# Patient Record
Sex: MALE | Race: WHITE | NOT HISPANIC OR LATINO | Employment: OTHER | URBAN - METROPOLITAN AREA
[De-identification: names, ages, dates, MRNs, and addresses within clinical notes are randomized per-mention and may not be internally consistent; named-entity substitution may affect disease eponyms.]

---
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
## 2024-12-25 ENCOUNTER — HOSPITAL ENCOUNTER (EMERGENCY)
Facility: CLINIC | Age: 19
Discharge: HOME | End: 2024-12-25
Attending: EMERGENCY MEDICINE
Payer: COMMERCIAL

## 2024-12-25 ENCOUNTER — TELEPHONE (OUTPATIENT)
Dept: BEHAVIORAL HEALTH | Facility: CLINIC | Age: 19
End: 2024-12-25

## 2024-12-25 VITALS
DIASTOLIC BLOOD PRESSURE: 91 MMHG | HEART RATE: 86 BPM | WEIGHT: 165 LBS | HEIGHT: 71 IN | TEMPERATURE: 98.3 F | RESPIRATION RATE: 20 BRPM | OXYGEN SATURATION: 100 % | SYSTOLIC BLOOD PRESSURE: 150 MMHG | BODY MASS INDEX: 23.1 KG/M2

## 2024-12-25 DIAGNOSIS — F31.9 BIPOLAR 1 DISORDER (H): ICD-10-CM

## 2024-12-25 PROCEDURE — 99284 EMERGENCY DEPT VISIT MOD MDM: CPT

## 2024-12-25 PROCEDURE — 80307 DRUG TEST PRSMV CHEM ANLYZR: CPT | Performed by: EMERGENCY MEDICINE

## 2024-12-25 PROCEDURE — 250N000013 HC RX MED GY IP 250 OP 250 PS 637: Performed by: EMERGENCY MEDICINE

## 2024-12-25 RX ORDER — HYDROXYZINE HYDROCHLORIDE 25 MG/1
25 TABLET, FILM COATED ORAL ONCE
Status: COMPLETED | OUTPATIENT
Start: 2024-12-25 | End: 2024-12-25

## 2024-12-25 RX ADMIN — HYDROXYZINE HYDROCHLORIDE 25 MG: 25 TABLET ORAL at 02:30

## 2024-12-25 ASSESSMENT — COLUMBIA-SUICIDE SEVERITY RATING SCALE - C-SSRS
SUICIDE, SINCE LAST CONTACT: NO
1. IN THE PAST MONTH, HAVE YOU WISHED YOU WERE DEAD OR WISHED YOU COULD GO TO SLEEP AND NOT WAKE UP?: NO
6. HAVE YOU EVER DONE ANYTHING, STARTED TO DO ANYTHING, OR PREPARED TO DO ANYTHING TO END YOUR LIFE?: NO
TOTAL  NUMBER OF INTERRUPTED ATTEMPTS SINCE LAST CONTACT: NO
2. HAVE YOU ACTUALLY HAD ANY THOUGHTS OF KILLING YOURSELF?: NO
ATTEMPT SINCE LAST CONTACT: NO
2. HAVE YOU ACTUALLY HAD ANY THOUGHTS OF KILLING YOURSELF IN THE PAST MONTH?: NO
6. HAVE YOU EVER DONE ANYTHING, STARTED TO DO ANYTHING, OR PREPARED TO DO ANYTHING TO END YOUR LIFE?: NO
1. SINCE LAST CONTACT, HAVE YOU WISHED YOU WERE DEAD OR WISHED YOU COULD GO TO SLEEP AND NOT WAKE UP?: NO
TOTAL  NUMBER OF ABORTED OR SELF INTERRUPTED ATTEMPTS SINCE LAST CONTACT: NO

## 2024-12-25 ASSESSMENT — ACTIVITIES OF DAILY LIVING (ADL)
ADLS_ACUITY_SCORE: 46

## 2024-12-25 NOTE — DISCHARGE INSTRUCTIONS
"Please follow-up with your established psychiatrist and therapist after this ED visit.  If you have worsening symptoms we can reevaluate you at any time.    A referral to the [facility] has been initiated for brief therapy. TC staff member will contact you within the next 24 hours to set up an appointment. [facility] is \"urgent care\" for mental health. Appointments could be virtual or in-person. TC can meet and provide services until you are able to start with your next level of care. Contact [facility]  #[phone] if you have questions.       [facility]  [address], [place] [address]  [phone]    "

## 2024-12-25 NOTE — ED PROVIDER NOTES
EMERGENCY DEPARTMENT ENCOUNTER      NAME: Davion Tomas  AGE: 19 year old male  YOB: 2005  MRN: [id number]  EVALUATION DATE & TIME: No admission date for patient encounter.    PCP: No Ref-Primary, Physician    ED PROVIDER: Troy Freeman M.D.      Chief Complaint   Patient presents with    Manic Behavior         FINAL IMPRESSION:  1. Bipolar 1 disorder (H)          ED COURSE & MEDICAL DECISION MAKING:    Pertinent Labs & Imaging studies reviewed. (See chart for details)  19 year old male presents to the Emergency Department for evaluation of mental health concern.  Patient states he is feeling more manic.  He is pacing in the room.  Denies suicidal ideation.  States he wants admission.  U tox is done and is negative.  Patient seen by the DEC .  At this time does not seem like he needs inpatient admission.  Patient would like some medication management.  Plan at this time is to have psych eval this morning to discuss med management and hopefully keep him out of the hospital.  Consult for this is put in.  Patient be signed out to the oncoming physician pending reevaluation.    12:47 AM I met with the patient to gather history and to perform my initial exam. I discussed the plan for care while in the Emergency Department.   2:08 AM I spoke with Monty Ivory with DEC      At the conclusion of the encounter I discussed the results of all of the tests and the disposition. The questions were answered. The patient or family acknowledged understanding and was agreeable with the care plan.     Medical Decision Making  Obtained supplemental history:Supplemental history obtained?: No  Reviewed external records: External records reviewed?: Documented in chart and Inpatient Record: inpatient admission 11/20/24  Care impacted by chronic illness:Alcohol and/or Drug Abuse or Dependence and Mental Health  Did you consider but not order tests?: Work up considered but not performed and documented in chart, if  "applicable  Did you interpret images independently?: Independent interpretation of ECG and images noted in documentation, when applicable.  Consultation discussion with other provider:Did you involve another provider (consultant, , pharmacy, etc.)?: I discussed the care with another health care provider, see documentation for details.  Admission considered. Patient was signed out to the oncoming physician, disposition pending.    MIPS: Not Applicable           MEDICATIONS GIVEN IN THE EMERGENCY:  Medications   hydrOXYzine HCl (ATARAX) tablet 25 mg (25 mg Oral $Given 12/25/24 8604)       NEW PRESCRIPTIONS STARTED AT TODAY'S ER VISIT  New Prescriptions    No medications on file          =================================================================    HPI    Patient information was obtained from: Patient        Davion Tomas is a 19 year old male with a pertinent history of ADHD, PTSD, autism, benzodiazepine abuse, bipolar who presents to this ED for evaluation of gianni.  Patient states he has been feeling more manic.  States this started yesterday after he got multiple text from his mom.  States he feels \"high\" though denies using any drugs.  States he is feeling no pain.  Having rushing thoughts.  States he is having flight of ideas.  Denies any suicidal ideation.  Denies any hallucinations currently.  States he is very hungry and wants to eat that seems to be his main concern right now.  He states has been compliant with his medications and last take his valproic acid at 12:15 AM.    Patient was recently admitted for mental health from November 20 to the 26.  This was for gianni and bipolar.  At that point he had similar symptoms.          PAST MEDICAL HISTORY:  Past Medical History:   Diagnosis Date    Environmental allergies        PAST SURGICAL HISTORY:  No past surgical history on file.        CURRENT MEDICATIONS:    No current facility-administered medications for this encounter.     Current Outpatient " "Medications   Medication Sig Dispense Refill    fluticasone (FLONASE) 50 MCG/ACT nasal spray Spray 1 spray into both nostrils daily 9.9 mL 0    propranolol (INDERAL) 20 MG tablet Take 1 tablet (20 mg) by mouth 2 times daily 60 tablet 0         ALLERGIES:  Allergies   Allergen Reactions    Cat Hair Extract Other (See Comments)     Wheezing, coughing, itching    Haloperidol Other (See Comments)    Other  [Seasonal Allergies] Other (See Comments)     Environmental Allergies and Sesame     Shellfish-Derived Products Hives and Swelling       FAMILY HISTORY:  No family history on file.    SOCIAL HISTORY:   Social History     Socioeconomic History    Marital status: Single   Tobacco Use    Smoking status: Never    Smokeless tobacco: Never   Substance and Sexual Activity    Alcohol use: Yes     Comment: occasional drinking    Drug use: Yes     Types: Marijuana     Comment: \"fake xanax\"; delta 8 gummies    Sexual activity: Not Currently     Social Drivers of Health     Financial Resource Strain: Medium Risk (11/20/2024)    [facility]    Financial Resource Strain     Difficulty of Paying Living Expenses: 2     Difficulty of Paying Living Expenses: 1   Food Insecurity: No Food Insecurity (11/20/2024)    Received from [facility]    Food Insecurity     Do you worry your food will run out before you are able to buy more?: 1   Transportation Needs: Unmet Transportation Needs (11/20/2024)    Received from [facility]    Transportation Needs     Does lack of transportation keep you from medical appointments?: 2     Does lack of transportation keep you from work, meetings or getting things that you need?: 2   Physical Activity: Inactive (11/23/2021)    Exercise Vital Sign     Days of Exercise per Week: 0 days     Minutes of Exercise per Session: 0 min   Social Connections: Socially Isolated (11/20/2024)    Received from " "[facility]    Social Connections     Do you often feel lonely or isolated from those around you?: 4   Housing Stability: Low Risk  (11/20/2024)    Received from [facility]    Housing Stability     What is your housing situation today?: 1       VITALS:  BP (!) 150/91   Pulse 86   Temp 98.3  F (36.8  C) (Temporal)   Resp 20   Ht 1.803 m (5' 11\")   Wt 74.8 kg (165 lb)   SpO2 100%   BMI 23.01 kg/m      PHYSICAL EXAM    Physical Exam  Vitals and nursing note reviewed.   Constitutional:       General: He is not in acute distress.     Appearance: He is not diaphoretic.   HENT:      Head: Atraumatic.   Eyes:      General: No scleral icterus.     Pupils: Pupils are equal, round, and reactive to light.   Cardiovascular:      Rate and Rhythm: Normal rate and regular rhythm.      Heart sounds: Normal heart sounds.   Pulmonary:      Effort: No respiratory distress.      Breath sounds: Normal breath sounds.   Abdominal:      Palpations: Abdomen is soft.      Tenderness: There is no abdominal tenderness.   Musculoskeletal:         General: No tenderness.   Lymphadenopathy:      Cervical: No cervical adenopathy.   Skin:     General: Skin is warm.      Findings: No rash.           LAB:  All pertinent labs reviewed and interpreted.  Labs Ordered and Resulted from Time of ED Arrival to Time of ED Departure   URINE DRUG SCREEN PANEL - Normal       Result Value    Amphetamines Urine Screen Negative      Barbituates Urine Screen Negative      Benzodiazepine Urine Screen Negative      Cannabinoids Urine Screen Negative      Cocaine Urine Screen Negative      Fentanyl Qual Urine Screen Negative      Opiates Urine Screen Negative      PCP Urine Screen Negative         RADIOLOGY:  Reviewed all pertinent imaging. Please see official radiology report.  No orders to display         Bhargavi HORTA, am serving as a scribe to document services personally performed by  " Troy Freeman, based on my observation and the provider's statements to me. I, Troy Freeman MD attest that Bhargavi Alvarado is acting in a scribe capacity, has observed my performance of the services and has documented them in accordance with my direction.    Troy Freeman M.D.  Emergency Medicine  [facility] EMERGENCY ROOM  [address] [address]  [phone]  Dept: [phone]       Troy Freeman MD  12/25/24 0657

## 2024-12-25 NOTE — ED TRIAGE NOTES
Patient presents to the ED in a maniac state.  He is pacing in triage.  He is cooperative with staff and pleasant.  Patient is requesting no hospital scrubs.  Patient described distress due to physical sensory issues due to autism.  He was see by writer with security and the wand was used to check for any metal.  He is in appropriate attire with no strings on clothing and no jewelry.  Patient denies thoughts of self harm.       Triage Assessment (Adult)       Row Name 12/25/24 0016          Triage Assessment    Airway WDL WDL        Respiratory WDL    Respiratory WDL WDL        Skin Circulation/Temperature WDL    Skin Circulation/Temperature WDL WDL        Cardiac WDL    Cardiac WDL WDL        Peripheral/Neurovascular WDL    Peripheral Neurovascular WDL WDL        Cognitive/Neuro/Behavioral WDL    Cognitive/Neuro/Behavioral WDL X;mood/behavior     Mood/Behavior anxious;excitable;hyperactive (agitated, impulsive);restless;cooperative        Roscoe Coma Scale    Best Eye Response 4-->(E4) spontaneous     Best Motor Response 6-->(M6) obeys commands     Best Verbal Response 5-->(V5) oriented     [place] Coma Scale Score 15     Assessment Qualifiers patient not sedated/intubated;no eye obstruction present

## 2024-12-25 NOTE — TELEPHONE ENCOUNTER
Libbyr spoke with pt and scheduled TC psychiatry appointment on 01/13/2024 @ 2:30 pm.    Winsome Garcia  12/25/2024  1116

## 2024-12-25 NOTE — PROGRESS NOTES
"Triage and Transition Services Extended Care Reassessment     Patient: Davion goes by \"Davion,\" uses he/him pronouns  Date of Service: December 25, 2024  Site of Service: [facility] EMERGENCY ROOM                               Patient was seen yes  Mode of Assessment: Virtual: [employer]     Reason for Reassessment: worsening psychosocial stress, other (see comment) (Pt expresses having concerns for gianni and insomnia.)    History of Patient's Original Emergency Room Encounter: Pt stated he wants his valproic acid dose doubled for an increased feeling of gianni and appeared to insist on IPMH placement. Pt denied SI, HI, NSSIB, hallucinations, and substance use. Pt reported familiarity with the DSM and presented his concerns by listing sx common to a manic episode. For example, pt cited grandiosity, however, he appeared to demonstrate insight which contradicted such delusions; pt reported experiencing a flight of ideas and, while presenting as anxious in affect/speech, his thoughts/speech appeared organized and he appeared very capable of following a conversation without diversion/distraction even when interrupted by this writer. Pt reported a decrease in appetite but still \"forcing\" himself to eat c. 2 meals a day (per chart notes, pt was reported to state upon/after arrival that he was very hungry and wanted to eat). Aside from pt endorsing a decreased need for sleep and expressing worry that he might lose control, pt's reporting of concerns did not appear to substantiate Criterion B for a manic episode. Collateral could have provided insight into pt's baseline, however, pt stated it would not be the best idea to contact his sister at 2 am to receive that information. Per a review of the chart, pt has past dx for ADHD, ASD, PTSD, benzodiazepine use disorder, and bipolar. Pt endorsed one past suicide attempt to overdose on Tylenol at age 12 and NSSIB by cutting between the ages of [age], which " "ended because he \"just stopped.\" Pt endorsed having a therapist for 1 month and minimal social supports, stating he has one friend and that, out of all of his family, he is only on good terms with his sister. Pt reported that other family members are associated with past trauma related to his dx of PTSD. While pt reported dropping out of college a second time two months ago and having difficulty holding employment, he denied neglecting self-care while endorsing an ability to perform ADLs and maintain medication compliance. Pt reported adequate savings (i.e., \"$25,000\"), active insurance, and his own apartment. Pt's chart suggests past misuse of benzodiazepines; pt denied this and stated he currently has a Rx for clonazepam.    Current Patient Presentation: Pt exhibits some mental health distress. He does have fair insight and judgment. He reports he is here because he is concerrned his mental health symptoms will worsen if he is not seen by psychiatry. He is requesting IPMH treatment but later changes his mind. Pt denies having suicidal ideation, plans, or intent. He denies HI, NSSI, or AVH's. He reports he has recently been getting about 2 hours of sleep per day.    Presentation Summary: Pt is seen by extended care for therapeutic check-in and reassessment. Exchanged greeting, introduced self and role. Pt was irritable and challenged this interviewer about the roles, position, title, purpose of the meeting. He frequently told interviewer to find the information in his medical records or insinuated the information could be found in medical records. Writer informed patient of the purpose of this interview was to collaboate with patient, obtain history, provide supportive therapy and reassessment, to assist in determining appropriate level of services. Pt was observed to be able to participate in the assessment as evidenced by verbal consent. Pt is standing during the interview and expresses feeling restlessness and " irritability. He feels like he is losing control. He reports to have a therapist and currently no psychaitrist. He reports to have a significant family history for trauma and abuse. He says he is triggered by the holidays. He did have a psychiatrist but they wanted to put him on ketamine and some other medications but patient believes he has bipolar disorder which is not intented to use ketamine treatments. Pt initially wanted inpatient psychiatric treatment and then stated he would like to be seen by a psychiatrist and be started on a new medication regimen. He states he currently on valproic acid. He was taking Olanzapine but he titrated off of that because he said it made him to tired and sleepy. Pt is requesting to try Latuda. Writer met with patient on two brief occasions to clarify what the patient wanted to have happen today. The second time the patient indicated he did not want inpatient but did continue to request to be seen by psychiatry. Writer placed a psychiatric consult order. Pt did not wait around and he requested to leave the ER. Writer was notified that patient had discharged and the extended care team placed a referral to Transition [facility] to follow up with patient. Pt left and did not participate in safety planning process.    Changes Observed Since Initial Assessment: decrease in presenting symptoms    Therapeutic Interventions Provided: Engaged in guided discovery, explored patient's perspectives and helped expand them through socratic dialogue., Engaged in cognitive restructuring/ reframing, looked at common cognitive distortions and challenged negative thoughts.    Current Symptoms: anxious, excessive worry, racing thoughts, obsessions/compulsions irritable, helplessness, sense of doom anxious inattentive, agitation, grandiosity      Mental Status Exam   Affect: Appropriate  Appearance: Appropriate  Attention Span/Concentration: Attentive  Eye Contact: Engaged    Fund of Knowledge:  Appropriate   Language /Speech Content: Fluent  Language /Speech Volume: Normal  Language /Speech Rate/Productions: Normal  Recent Memory: Intact  Remote Memory: Intact  Mood: Anxious, Irritable  Orientation to Person: Yes   Orientation to Place: Yes  Orientation to Time of Day: Yes  Orientation to Date: Yes     Situation (Do they understand why they are here?): Yes  Psychomotor Behavior: Normal  Thought Content: Clear  Thought Form: Goal Directed    Treatment Objective(s) Addressed: rapport building, orienting the patient to therapy, processing feelings, assessing safety, identifying additional supports, building skills    Patient Response to Interventions: acceptance expressed, verbalizes understanding    Progress Towards Goals:  Patient Reports Symptoms Are: improving  Patient Progress Toward Goals: is making progress  Comment: Pt has been minimally receptive to ED interventions.Pt is considered to be clear and stable from mental health perspective. He does not require IPMH treatment.  Next Step to Work Toward Discharge: follow up on referrals  Symptom Stabilization Comment: Pt is referred to transition clinic for psychiatric provider follow up.    Case Management: Case Management Included: collaborating with patient's support system  Details on Collaborating with Patient's Support System: Renetta Joaquin, [phone]    C-SSRS Since Last Contact:   1. Wish to be Dead (Since Last Contact): No  2. Non-Specific Active Suicidal Thoughts (Since Last Contact): No     Actual Attempt (Since Last Contact): No  Has subject engaged in non-suicidal self-injurious behavior? (Since Last Contact): No  Interrupted Attempts (Since Last Contact): No  Aborted or Self-Interrupted Attempt (Since Last Contact): No  Preparatory Acts or Behavior (Since Last Contact): No  Suicide (Since Last Contact): No     Calculated C-SSRS Risk Score (Since Last Contact): No Risk Indicated    Plan: Final Disposition / Recommended Care Path:  discharge  Plan for Care reviewed with assigned Medical Provider: yes  Plan for Care Team Review: provider    Comments: consulted with ER provider regarding disposition and planning  Patient and/or validated legal guardian concurs: yes    Clinical Substantiation: Pt exhibits some mental health distress. He does have fair insight and judgment. He reports he is here because he is concerrned his mental health symptoms will worsen if he is not seen by psychiatry. He is requesting IPMH treatment. Pt denies having suicidal ideation, plans, or intent. He denies HI, NSSI, or AVH's. He reports he has recently been getting about 2 hours of sleep per day.    Legal Status: Legal Status: Voluntary/Patient has signed consent for treatment    Session Status: Time session started: 0932  Time session ended: 1000  Session Duration (minutes): 28 minutes  Session Number: 1  Anticipated number of sessions or this episode of care: 1    Session Start Time: 0932  Session Stop Time: 1000  CPT codes: 03941 - Psychotherapy (with patient) - 30 (16-37*) min  Time Spent: 28 minutes      CPT code(s) utilized: 26083 - Psychotherapy (with patient) - 30 (16-37*) min    Diagnosis:   Patient Active Problem List   Diagnosis Code    Tylenol overdose, intentional self-harm, initial encounter (H) T39.1X2A    Attention-deficit hyperactivity disorder, unspecified type F90.9    Moderate recurrent major depression (H) F33.1    PHILLIP (generalized anxiety disorder) F41.1    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

## 2024-12-25 NOTE — PLAN OF CARE
Davion Tomas  December 25, 2024  Plan of Care Hand-off Note     Patient Recommended Care Path: observation    Clinical Substantiation:  It is the recommendation of this provider that pt remain under observation with psychiatry and extended care. Pt presented to the ER out of concern for gianni and the possibility of losing control, along with being triggered by multiple texts/calls from family after he cancelled travel plans to meet with them over the holiday. Pt reported familiarity with the DSM and cited common sx of a manic episode, however, pt's presentation and narrative of events did not appear to substantiate sufficient criteria during the assessment. Pt requested an increase in valproic acid and appeared to insist upon voluntary IPMH placement. Pt endorsed remaining compliant on medications, an ability to perform ADLs, having safe/stable housing, and sufficent funds in savings for immediate present ($25,000) although currently unemployed.  Pt denied SI, HI, NSSIB, hallucinations, and substance use. Although collateral could not be obtained for insight into pt's baseline and behaviors according to the observation of others, not enough evidence appeared present to require IPMH placement. Pt denied psychosis, did not report or show signs of complete neglect for self-care or deterioration, denied thoughts/plans of harming himself or others, and did not provide evidence for and has not been reported as demonstrating behaviors that are aggressive, intrusive, or disruptive. As pt has a dx of bipolar, his concerns for the possibility of re-experiencing a manic episode or that his medications may not be entirely sufficient appear valid, along his concerns about his ability to maintain important roles/responsibilities following task failure in school and work settings. Therefore, the lesser restrictive level of care of observation is currently recommended to provide hospital staff time to gather collateral and  provide psychiatric consultation to determine if pt is in need of IPMH placement or would more likely benefit from other services/resources.    Goals for crisis stabilization:  Reduce distress, enhance locus of control, develop additional coping skills    Next steps for Care Team:  Monitoring under observation to determine acuity of sx, obtain collateral, psychiatric consultation, serial reassessments, support with resources/referrals.    Treatment Objectives Addressed:  rapport building, processing feelings, assessing safety, identifying additional supports    Therapeutic Interventions:  Engaged in guided discovery, explored patient's perspectives and helped expand them through socratic dialogue.    Has a specific means been identified for suicidal.homicide actions: No    Patient coping skills attempted to reduce the crisis:  Help-seeking, medication compliance    Collateral contact information: n/a    Legal Status: Voluntary/Patient has signed consent for treatment                         Reviewed court records: yes     Psychiatry Consult: Patient has Psychiatry Consult Order    Monty Quinn Psychotherapist Trainee

## 2024-12-25 NOTE — CONSULTS
"Diagnostic Evaluation Consultation  Crisis Assessment    Patient Name: Davion Tomas  Age:  19 year old  Legal Sex: male  Gender Identity: male  Pronouns:   Race: White  Ethnicity: Not  or   Language: English      Patient was assessed: In person   Crisis Assessment Start Date: 12/25/24  Crisis Assessment Start Time: 0131  Crisis Assessment Stop Time: 0157  Patient location: [facility] EMERGENCY ROOM                             [id number]    Referral Data and Chief Complaint  Davion Tomas presents to the ED by  self. Patient is presenting to the ED for the following concerns: Worsening psychosocial stress. Factors that make the mental health crisis life threatening or complex are: Pt arrived at the ER, having driven himself, presenting with worsening psychosocial stress. Pt stated he is triggered by his \"family blowing up (his) phone\" after he cancelled his plane ticked to visit them in [place] for the holiday.      Informed Consent and Assessment Methods  Explained the crisis assessment process, including applicable information disclosures and limits to confidentiality, assessed understanding of the process, and obtained consent to proceed with the assessment.  Assessment methods included conducting a formal interview with patient, review of medical records, collaboration with medical staff, and obtaining relevant collateral information from family and community providers when available.  : done     History of the Crisis   Pt stated he wants his valproic acid dose doubled for an increased feeling of gianni and appeared to insist on IPMH placement. Pt denied SI, HI, NSSIB, hallucinations, and substance use. Pt reported familiarity with the DSM and presented his concerns by listing sx common to a manic episode. For example, pt cited grandiosity, however, he appeared to demonstrate insight which contradicted such delusions; pt reported experiencing a flight of ideas and, " "while presenting as anxious in affect/speech, his thoughts/speech appeared organized and he appeared very capable of following a conversation without diversion/distraction even when interrupted by this writer. Pt reported a decrease in appetite but still \"forcing\" himself to eat c. 2 meals a day (per chart notes, pt was reported to state upon/after arrival that he was very hungry and wanted to eat). Aside from pt endorsing a decreased need for sleep and expressing worry that he might lose control, pt's reporting of concerns did not appear to substantiate Criterion B for a manic episode. Collateral could have provided insight into pt's baseline, however, pt stated it would not be the best idea to contact his sister at 2 am to receive that information. Per a review of the chart, pt has past dx for ADHD, ASD, PTSD, benzodiazepine use disorder, and bipolar. Pt endorsed one past suicide attempt to overdose on Tylenol at age 12 and NSSIB by cutting between the ages of [age], which ended because he \"just stopped.\" Pt endorsed having a therapist for 1 month and minimal social supports, stating he has one friend and that, out of all of his family, he is only on good terms with his sister. Pt reported that other family members are associated with past trauma related to his dx of PTSD. While pt reported dropping out of college a second time two months ago and having difficulty holding employment, he denied neglecting self-care while endorsing an ability to perform ADLs and maintain medication compliance. Pt reported adequate savings (i.e., \"$25,000\"), active insurance, and his own apartment. Pt's chart suggests past misuse of benzodiazepines; pt denied this and stated he currently has a Rx for clonazepam.    Brief Psychosocial History  Family:   , Children    Support System:  Sibling(s), Friend  Employment Status:  unemployed  Source of Income:  none  Financial Environmental Concerns:  none  Current Hobbies:  " arts/crafts  Barriers in Personal Life:       Significant Clinical History  Current Anxiety Symptoms:  anxious  Current Depression/Trauma:  avoidance, sense of doom  Current Somatic Symptoms:     Current Psychosis/Thought Disturbance:     Current Eating Symptoms:  loss of appetite  Chemical Use History:  Alcohol: None  Benzodiazepines: None  Opiates: None  Cocaine: None  Marijuana: None  Other Use: None   Past diagnosis:  ADHD, Bipolar Disorder, Suicide attempt(s), PTSD, Autism  Family history:  No known history of mental health or chemical health concerns  Past treatment:  Individual therapy, Primary Care, Psychiatric Medication Management, Inpatient Hospitalization  Details of most recent treatment:  OP psychiatry and therapy  Other relevant history:       Have there been any medication changes in the past two weeks:  no       Is the patient compliant with medications:  yes        Collateral Information  Is there collateral information: No (Pt declined for this writer to contact collateral at this time of night)      Risk Assessment  [place] Suicide Severity Rating Scale Full Clinical Version:  Suicidal Ideation  Q1 Wish to be Dead (Lifetime): Yes  Q2 Non-Specific Active Suicidal Thoughts (Lifetime): No  3. Active Suicidal Ideation with any Methods (Not Plan) Without Intent to Act (Lifetime): No  Q4 Active Suicidal Ideation with Some Intent to Act, Without Specific Plan (Lifetime): No  Q5 Active Suicidal Ideation with Specific Plan and Intent (Lifetime): Yes  Q6 Suicide Behavior (Lifetime): yes     Suicidal Behavior (Lifetime)  Actual Attempt (Lifetime): Yes  Total Number of Actual Attempts (Lifetime): 1  Actual Attempt Description (Lifetime): Attempted OD on Tylenol at age 12  Has subject engaged in non-suicidal self-injurious behavior? (Lifetime): Yes  Interrupted Attempts (Lifetime): No  Aborted or Self-Interrupted Attempt (Lifetime): No  Preparatory Acts or Behavior (Lifetime): Yes  Total Number of  Preparatory Acts (Lifetime): 1  Preparatory Acts or Behavior Description (Lifetime): Gathered [facility] Suicide Severity Rating Scale Recent:   Suicidal Ideation (Recent)  Q1 Wished to be Dead (Past Month): no  Q2 Suicidal Thoughts (Past Month): no  Level of Risk per Screen: no risks indicated     Suicidal Behavior (Recent)  Actual Attempt (Past 3 Months): No  Has subject engaged in non-suicidal self-injurious behavior? (Past 3 Months): No  Interrupted Attempts (Past 3 Months): No  Aborted or Self-Interrupted Attempt (Past 3 Months): No  Preparatory Acts or Behavior (Past 3 Months): No    Environmental or Psychosocial Events: work or task failure, challenging interpersonal relationships, social isolation, neither working nor attending school  Protective Factors: Protective Factors: lives in a responsibly safe and stable environment, sense of importance of health and wellness, able to access care without barriers, supportive ongoing medical and mental health care relationships, help seeking, good problem-solving, coping, and conflict resolution skills, good impulse control, constructive use of leisure time, enjoyable activities, resilience, reality testing ability, optimistic outlook - identification of future goals    Does the patient have thoughts of harming others? Feels Like Hurting Others: no  Previous Attempt to Hurt Others: no  Does Patient have a known history of aggressive behavior: No    Is the patient engaging in sexually inappropriate behavior?           Mental Status Exam   Affect: Appropriate  Appearance: Appropriate  Attention Span/Concentration: Attentive  Eye Contact: Engaged    Fund of Knowledge: Appropriate   Language /Speech Content: Fluent  Language /Speech Volume: Normal  Language /Speech Rate/Productions: Normal  Recent Memory: Intact  Remote Memory: Intact  Mood: Anxious  Orientation to Person: Yes   Orientation to Place: Yes  Orientation to Time of Day: Yes  Orientation to Date: Yes      Situation (Do they understand why they are here?): Yes  Psychomotor Behavior: Normal  Thought Content: Clear  Thought Form: Intact     Medication  Psychotropic medications:   Medication Orders - Psychiatric (From admission, onward)      None          No current facility-administered medications for this encounter.     Current Outpatient Medications   Medication Sig Dispense Refill    fluticasone (FLONASE) 50 MCG/ACT nasal spray Spray 1 spray into both nostrils daily 9.9 mL 0    propranolol (INDERAL) 20 MG tablet Take 1 tablet (20 mg) by mouth 2 times daily 60 tablet 0          Current Care Team  Patient Care Team:  Clinic - [facility] as PCP - General (Clinic)  Viktor Flynn    Diagnosis  Patient Active Problem List   Diagnosis Code    Tylenol overdose, intentional self-harm, initial encounter (H) T39.1X2A    Attention-deficit hyperactivity disorder, unspecified type F90.9    Moderate recurrent major depression (H) F33.1    PHILLIP (generalized anxiety disorder) F41.1    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 Pt presented to the ER out of concern for gianni and the possibility of losing control, along with being triggered by multiple texts/calls from family after he cancelled travel plans to meet with them over the holiday. Pt reported familiarity with the DSM and cited common sx of a manic episode, however, pt's presentation and narrative of events did not appear to substantiate  sufficient criteria during the assessment. Pt requested an increase in valproic acid and appeared to insist upon voluntary IPMH placement. Pt endorsed remaining compliant on medications, an ability to perform ADLs, having safe/stable housing, and sufficent funds in savings for immediate present ($25,000) although currently unemployed.  Pt denied SI, HI, NSSIB, hallucinations, and substance use. Although collateral could not be obtained for insight into pt's baseline and behaviors according to the observation of others, not enough evidence appeared present to require IPMH placement. Pt denied psychosis, did not report or show signs of complete neglect for self-care or deterioration, denied thoughts/plans of harming himself or others, and did not provide evidence for and has not been reported as demonstrating behaviors that are aggressive, intrusive, or disruptive. As pt has a dx of bipolar, his concerns for the possibility of re-experiencing a manic episode or that his medications may not be entirely sufficient appear valid, along his concerns about his ability to maintain important roles/responsibilities following task failure in school and work settings. Therefore, the lesser restrictive level of care of observation is currently recommended to provide hospital staff time to gather collateral and provide psychiatric consultation to determine if pt is in need of IPMH placement or would more likely benefit from other services/resources.    Goals for crisis stabilization:  Reduce distress, enhance locus of control, develop additional coping skills    Next steps for Care Team:  Monitoring under observation to determine acuity of sx, obtain collateral, psychiatric consultation, serial reassessments, support with resources/referrals.    Treatment Objectives Addressed:  rapport building, processing feelings, assessing safety, identifying additional supports    Therapeutic Interventions:  Engaged in guided discovery,  explored patient's perspectives and helped expand them through socratic dialogue.    Has a specific means been identified for suicidal/homicide actions: No    Patient coping skills attempted to reduce the crisis:  Help-seeking, medication compliance    Disposition  Recommended referrals: Individual Therapy, Medication Management        Reviewed case and recommendations with attending provider. Attending Name: Dr. Freeman       Attending concurs with disposition: yes       Patient and/or validated legal guardian concurs with disposition:   no       Final disposition:  observation      Legal status: Voluntary/Patient has signed consent for treatment                         Reviewed court records: yes     Assessment Details   Total duration spent with the patient: 26 min     CPT code(s) utilized: 20044 - Psychotherapy (with patient) - 30 (16-37*) min    Monty Quinn Psychotherapist Trainee  [facility] - Triage & Transition Services  Callback:  [phone]

## 2024-12-25 NOTE — ED NOTES
EMERGENCY DEPARTMENT SIGN OUT NOTE        ED COURSE AND MEDICAL DECISION MAKING  Patient was signed out to me by Dr Troy Fereman at 7:06 AM    In brief, Davion Tomas is a 19 year old male who initially presented for evaluation of manic behavior. Patient described distress due to physical sensory issues secondary to autism. Patient denies any thoughts of self harm, and Is in appropriate attire with no strings or jewelry.      At time of sign out, disposition was pending ***    FINAL IMPRESSION    1. Bipolar 1 disorder (H)        ED MEDS  Medications   hydrOXYzine HCl (ATARAX) tablet 25 mg (25 mg Oral $Given 12/25/24 5728)       LAB  Labs Ordered and Resulted from Time of ED Arrival to Time of ED Departure   URINE DRUG SCREEN PANEL - Normal       Result Value    Amphetamines Urine Screen Negative      Barbituates Urine Screen Negative      Benzodiazepine Urine Screen Negative      Cannabinoids Urine Screen Negative      Cocaine Urine Screen Negative      Fentanyl Qual Urine Screen Negative      Opiates Urine Screen Negative      PCP Urine Screen Negative         EKG  ***    RADIOLOGY    No orders to display       DISCHARGE MEDS  New Prescriptions    No medications on file       Viktor Key M.D.   [facility] EMERGENCY ROOM  [address] [place] [id number] [phone]

## 2024-12-25 NOTE — ED NOTES
"Pt is awake and requesting crackers. He was informed that breakfast was coming soon and he was curious as to what was being served as he is \"a picky eater\".  Pt was stating that he needs more or better meds for his gianni and that he doesn't feel safe currently staying at his mothers house, and especially at his own place.  He states he feels similar to when he had a psychotic episode and he is not wanting to do that again.    "

## 2025-01-13 ENCOUNTER — VIRTUAL VISIT (OUTPATIENT)
Dept: BEHAVIORAL HEALTH | Facility: CLINIC | Age: 20
End: 2025-01-13
Payer: COMMERCIAL

## 2025-01-13 VITALS — HEIGHT: 71 IN | WEIGHT: 168 LBS | BODY MASS INDEX: 23.52 KG/M2

## 2025-01-13 DIAGNOSIS — F84.0 AUTISM SPECTRUM DISORDER: ICD-10-CM

## 2025-01-13 DIAGNOSIS — F90.9 ATTENTION DEFICIT HYPERACTIVITY DISORDER (ADHD), UNSPECIFIED ADHD TYPE: ICD-10-CM

## 2025-01-13 DIAGNOSIS — F31.9 BIPOLAR I DISORDER (H): Primary | ICD-10-CM

## 2025-01-13 RX ORDER — TRAZODONE HYDROCHLORIDE 100 MG/1
100 TABLET ORAL AT BEDTIME
Qty: 30 TABLET | Refills: 1 | Status: SHIPPED | OUTPATIENT
Start: 2025-01-13

## 2025-01-13 RX ORDER — VALPROIC ACID 250 MG/1
250 CAPSULE ORAL 2 TIMES DAILY
Qty: 60 CAPSULE | Refills: 1 | Status: SHIPPED | OUTPATIENT
Start: 2025-01-13

## 2025-01-13 ASSESSMENT — ANXIETY QUESTIONNAIRES
GAD7 TOTAL SCORE: 19
7. FEELING AFRAID AS IF SOMETHING AWFUL MIGHT HAPPEN: NEARLY EVERY DAY
GAD7 TOTAL SCORE: 19
7. FEELING AFRAID AS IF SOMETHING AWFUL MIGHT HAPPEN: NEARLY EVERY DAY
5. BEING SO RESTLESS THAT IT IS HARD TO SIT STILL: NEARLY EVERY DAY
8. IF YOU CHECKED OFF ANY PROBLEMS, HOW DIFFICULT HAVE THESE MADE IT FOR YOU TO DO YOUR WORK, TAKE CARE OF THINGS AT HOME, OR GET ALONG WITH OTHER PEOPLE?: EXTREMELY DIFFICULT
1. FEELING NERVOUS, ANXIOUS, OR ON EDGE: NEARLY EVERY DAY
IF YOU CHECKED OFF ANY PROBLEMS ON THIS QUESTIONNAIRE, HOW DIFFICULT HAVE THESE PROBLEMS MADE IT FOR YOU TO DO YOUR WORK, TAKE CARE OF THINGS AT HOME, OR GET ALONG WITH OTHER PEOPLE: EXTREMELY DIFFICULT
6. BECOMING EASILY ANNOYED OR IRRITABLE: MORE THAN HALF THE DAYS
GAD7 TOTAL SCORE: 19
2. NOT BEING ABLE TO STOP OR CONTROL WORRYING: NEARLY EVERY DAY
4. TROUBLE RELAXING: NEARLY EVERY DAY
3. WORRYING TOO MUCH ABOUT DIFFERENT THINGS: MORE THAN HALF THE DAYS

## 2025-01-13 ASSESSMENT — PAIN SCALES - GENERAL: PAINLEVEL_OUTOF10: NO PAIN (0)

## 2025-01-13 ASSESSMENT — PATIENT HEALTH QUESTIONNAIRE - PHQ9
SUM OF ALL RESPONSES TO PHQ QUESTIONS 1-9: 17
SUM OF ALL RESPONSES TO PHQ QUESTIONS 1-9: 17
10. IF YOU CHECKED OFF ANY PROBLEMS, HOW DIFFICULT HAVE THESE PROBLEMS MADE IT FOR YOU TO DO YOUR WORK, TAKE CARE OF THINGS AT HOME, OR GET ALONG WITH OTHER PEOPLE: EXTREMELY DIFFICULT

## 2025-01-13 NOTE — PATIENT INSTRUCTIONS
Change:  Trazodone/Desyrel 100 mg at bedtime    Continue:  Valproic Acid/Depakene 250 mg twice daily      ----------------------------      -If there are questions/inquiries between now and the next appointment OR prior to transferring to the next level of care, please call (see below).    Clinic hours/phone number at the Transition Clinic:   -Monday to Friday from 8:00 am to 4:30 pm  -[phone]    -----------------------    Call:  -[phone] ([phone]) if guidance is needed after T.C. clinic hours about utilizing [facility] versus the Emergency Departments    ----------------------    Any time (during or after regular clinic hours) immediate and or life threatening symptoms occur (either medical or mental health), call:  [phone] and or go to the nearest Emergency Department      ---------------------    Please use the following websites to obtain a comprehensive list of all counties within the state of [place] for adult and child mental health crisis numbers:    [place]://[place].[facility]/dhs/people-we-serve/people-with-disabilities/health-care/adult-mental-health/resources/crisis-contacts.jsp    https://[place].gov/dhs/people-we-serve/people-with-disabilities/health-care/childrens-mental-health/resources/crisis-contacts.jsp      -------------------      Below is a list of county (also found on the above websites), state, and national crisis numbers.   These resources can provide real-time assistance if experiencing moderate to severe mental health symptoms.        Additionally, please visit your county website to find the most up-to-date list of local:  adult, child, family, and chemical dependency services available.        [facility]  [phone]    [facility]  [phone]    [facility]  [phone]    [facility]  [phone]    [facility]  [phone]: Adults 18 and over    [phone]: Children 17 and under    [facility] Acute Psychiatric Services  (APS) Assessment & Referral:   [phone]    Community Outreach for Psychiatric Emergencies (COPE):  [phone]    [facility]  [phone]: Adults 18 and over    [phone]: Children 17 and under      Walk-in crisis services are available Monday to Friday from 8 am to 5:30 pm at Urgent Care for  Adult metal health:    [address] [place] [address]  Closed on Saturday, Sunday, and holidays    [place]  [phone]: Toll free    [facility]  [phone]      [facility] [place]  [phone]: Toll free    Text: [place] to 149073      [facility] and [employer] services  Call 211 or visit https://www.211[address]way.org to obtain free and confidential h/hs information     [place] [facility]  If you are an adult needing support, you can talk to a specialist who has first hand experience living with a mental health condition:    [phone]    Text: Support to 45751    [facility] [place]:  domestic violence/LGBTQ+  [phone] option 3    [employer]: LGBTQ+  3-448-855-9672    Text: START to 975289    [facility] [facility]  4-020-VcztEnf: (1-703.739.5096)    Crisis line: 1-414.252.1126    Text: 167033    [employer]: women and sexually exploited youth  [phone]    [employer] Domestic Violence Shelter Hotline  288.412.1178    National Suicide Prevention Lifeline  985    National Youth Crisis Hotline  920
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
## 2025-01-13 NOTE — NURSING NOTE
Current patient location: [address] [facility] [address]    Is the patient currently in the state of [place]? YES    Visit mode: VIDEO    If the visit is dropped, the patient can be reconnected by:VIDEO VISIT: Text to cell phone:   Telephone Information:   Mobile [phone]       Will anyone else be joining the visit? NO  (If patient encounters technical issues they should call [phone]    Are changes needed to the allergy or medication list? No    Are refills needed on medications prescribed by this physician? NO    Rooming Documentation:  Attendance guidelines (MH&A only) Patient not very cooperative     Reason for visit: Consult    Liyah BENITES

## 2025-02-04 DIAGNOSIS — F31.9 BIPOLAR I DISORDER (H): ICD-10-CM

## 2025-02-04 RX ORDER — VALPROIC ACID 250 MG/1
250 CAPSULE ORAL 2 TIMES DAILY
Qty: 180 CAPSULE | Refills: 0 | OUTPATIENT
Start: 2025-02-04

## 2025-02-04 NOTE — TELEPHONE ENCOUNTER
Date of Last Office Visit: Virtual Visit with Ofelia Sanchez APRN CNP (01/13/2025)   Date of Next Office Visit: Appointment with Ofelia Sanchez APRN CNP (02/17/2025)   No shows since last visit: No  More than one patient-initiated cancellation (with reschedule) since last seen in clinic? No    []Medication refilled per  Medication Refill in Ambulatory Care  policy.  [x]Medication unable to be refilled by RN due to criteria not met as indicated below:    []Eligibility: has not had a provider visit within last 6 months   []Supervision: no future appointment; < 7 days before next appointment   []Compliance: no shows; cancellations; lapse in therapy   []Verification: order discrepancy; may need modification...   [] > 30-day supply request   []Advanced refill request: > 7 days before refill date   []Controlled medication   []Medication not included in policy   []Review: new med; med adjusted <= 30 days; safety alert; requires lab monitoring...   []Scope of Practice: refill request processed by LPN/MA   [x]Other:Medication is too soon to fill        Medication(s) requested:     -  valproic acid (DEPAKENE) 250 MG capsule  Date last ordered: 1/13/2025  Qty: 180  Refills: 0  Appropriate for refill? No: pharmacy should have refill(s) on file and request to be addressed at appointment on 2/17/25     Any Controlled Substance(s)? No      Requested medication(s) verified as identical to current order? No: 90 day    Any lapse in adherence to medication(s) greater than 5 days? No      Additional action taken? none.      Last visit treatment plan:      1/13/25  TREATMENT PLAN        Medications:  Change:  Trazodone/Desyrel 100 mg at bedtime; increased to 50 mg/day     Continue:  Valproic Acid/Depakene 250 mg twice daily        Labs:  -None Obtained     ------------     -Reviewed 12/3/2024 labs = WNL with exception of ALT = 90  -Repeat liver/hepatic panel within 3 to 6 months           Therapy and or Non-pharmacological  "modalities:  -Continue individual therapy           Disposition:  -Has been advised of consultative Transition Clinic model     -Please follow up at the Transition Clinic for medication management in:   4 to 6 weeks              Total time: 67 minutes per:     -Review of EMR including but not limited to: tabs within Chart Review + outside records if applicable   -Appointment time  -Documentation              Ofelia Sanchez  APRN-CNP,  PM-BC       Any medication(s) require lab monitoring? Yes   DEPAKOTE   Last Depakote Level: No results found for: \"VALPROATE\"  Last CBC with Platelets and Differential:   WBC Count   Date Value Ref Range Status   09/23/2023 11.8 (H) 4.0 - 11.0 10e3/uL Final     RBC Count   Date Value Ref Range Status   09/23/2023 4.58 4.40 - 5.90 10e6/uL Final     Hemoglobin   Date Value Ref Range Status   09/23/2023 15.0 13.3 - 17.7 g/dL Final     Hematocrit   Date Value Ref Range Status   09/23/2023 42.5 40.0 - 53.0 % Final     MCV   Date Value Ref Range Status   09/23/2023 93 78 - 100 fL Final     MCH   Date Value Ref Range Status   09/23/2023 32.8 26.5 - 33.0 pg Final     MCHC   Date Value Ref Range Status   09/23/2023 35.3 31.5 - 36.5 g/dL Final     RDW   Date Value Ref Range Status   09/23/2023 11.8 10.0 - 15.0 % Final     Platelet Count   Date Value Ref Range Status   09/23/2023 264 150 - 450 10e3/uL Final     % Neutrophils   Date Value Ref Range Status   09/23/2023 51 % Final     % Lymphocytes   Date Value Ref Range Status   09/23/2023 37 % Final     % Monocytes   Date Value Ref Range Status   09/23/2023 8 % Final     % Eosinophils   Date Value Ref Range Status   09/23/2023 3 % Final     % Basophils   Date Value Ref Range Status   09/23/2023 1 % Final     % Immature Granulocytes   Date Value Ref Range Status   09/23/2023 0 % Final     Absolute Neutrophils   Date Value Ref Range Status   09/23/2023 6.0 1.6 - 8.3 10e3/uL Final     Absolute Lymphocytes   Date Value Ref Range Status   09/23/2023 " 4.3 0.8 - 5.3 10e3/uL Final     Absolute Monocytes   Date Value Ref Range Status   09/23/2023 1.0 0.0 - 1.3 10e3/uL Final     Absolute Eosinophils   Date Value Ref Range Status   09/23/2023 0.4 0.0 - 0.7 10e3/uL Final     Absolute Basophils   Date Value Ref Range Status   09/23/2023 0.1 0.0 - 0.2 10e3/uL Final     Absolute Immature Granulocytes   Date Value Ref Range Status   09/23/2023 0.0 <=0.4 10e3/uL Final      Last BMP/CMP Lab Values:   Sodium   Date Value Ref Range Status   09/23/2023 140 136 - 145 mmol/L Final     Potassium   Date Value Ref Range Status   09/23/2023 4.6 3.4 - 5.3 mmol/L Final     Chloride   Date Value Ref Range Status   09/23/2023 106 98 - 107 mmol/L Final     Carbon Dioxide (CO2)   Date Value Ref Range Status   09/23/2023 25 22 - 29 mmol/L Final     Anion Gap   Date Value Ref Range Status   09/23/2023 9 7 - 15 mmol/L Final     Urea Nitrogen   Date Value Ref Range Status   09/23/2023 13.6 6.0 - 20.0 mg/dL Final     Creatinine   Date Value Ref Range Status   09/23/2023 0.93 0.67 - 1.17 mg/dL Final     GFR Estimate   Date Value Ref Range Status   09/23/2023 >90 >60 mL/min/1.73m2 Final     Calcium   Date Value Ref Range Status   09/23/2023 8.8 8.6 - 10.0 mg/dL Final     Glucose   Date Value Ref Range Status   09/23/2023 94 70 - 99 mg/dL Final     Alkaline Phosphatase   Date Value Ref Range Status   09/23/2023 77 40 - 129 U/L Final     AST   Date Value Ref Range Status   09/23/2023 16 0 - 35 U/L Final     Comment:     Reference intervals for this test were updated on 6/12/2023 to more accurately reflect our healthy population. There may be differences in the flagging of prior results with similar values performed with this method. Interpretation of those prior results can be made in the context of the updated reference intervals.     ALT   Date Value Ref Range Status   09/23/2023 19 0 - 50 U/L Final     Comment:     Reference intervals for this test were updated on 6/12/2023 to more accurately  reflect our healthy population. There may be differences in the flagging of prior results with similar values performed with this method. Interpretation of those prior results can be made in the context of the updated reference intervals.       Protein Total   Date Value Ref Range Status   09/23/2023 7.0 6.4 - 8.3 g/dL Final     Albumin   Date Value Ref Range Status   09/23/2023 4.3 3.5 - 5.2 g/dL Final     Bilirubin Total   Date Value Ref Range Status   09/23/2023 0.5 <=1.2 mg/dL Final

## 2025-02-06 RX ORDER — VALPROIC ACID 250 MG/1
250 CAPSULE ORAL 2 TIMES DAILY
Qty: 180 CAPSULE | Refills: 0 | Status: SHIPPED | OUTPATIENT
Start: 2025-02-06

## 2025-02-06 NOTE — TELEPHONE ENCOUNTER
Date of Last Office Visit: Virtual Visit with Ofelia Sanchez APRN CNP (01/13/2025)   Date of Next Office Visit: Appointment with Ofelia Sanchez APRN CNP (02/17/2025)   No shows since last visit: No  More than one patient-initiated cancellation (with reschedule) since last seen in clinic? No    []Medication refilled per  Medication Refill in Ambulatory Care  policy.  [x]Medication unable to be refilled by RN due to criteria not met as indicated below:    []Eligibility: has not had a provider visit within last 6 months   []Supervision: no future appointment; < 7 days before next appointment   []Compliance: no shows; cancellations; lapse in therapy   []Verification: order discrepancy; may need modification...   [x] > 30-day supply request - 90 day refill request.   []Advanced refill request: > 7 days before refill date   []Controlled medication   [x]Medication not included in policy   [x]Review: new med; med adjusted <= 30 days; safety alert; requires lab monitoring...   []Scope of Practice: refill request processed by LPN/MA   [x]Other:2nd request from the pharmacy. Pended and forwarding to Ofelia Sanchez CNP for review.        Medication(s) requested:     -  valproic acid (DEPAKENE) 250 MG capsule  Date last ordered: 1/13/2025  Qty: 180  Refills: 0  Appropriate for refill? No: pharmacy should have refill(s) on file and provider to review if 90 day refill appropriate.    Any Controlled Substance(s)? No      Requested medication(s) verified as identical to current order? No: 90 day    Any lapse in adherence to medication(s) greater than 5 days? No      Additional action taken? none.      Last visit treatment plan:      1/13/25  TREATMENT PLAN        Medications:  Change:  Trazodone/Desyrel 100 mg at bedtime; increased to 50 mg/day     Continue:  Valproic Acid/Depakene 250 mg twice daily        Labs:  -None Obtained     ------------     -Reviewed 12/3/2024 labs = WNL with exception of ALT = 90  -Repeat  "liver/hepatic panel within 3 to 6 months           Therapy and or Non-pharmacological modalities:  -Continue individual therapy           Disposition:  -Has been advised of consultative Transition Clinic model     -Please follow up at the Transition Clinic for medication management in:   4 to 6 weeks              Total time: 67 minutes per:     -Review of EMR including but not limited to: tabs within Chart Review + outside records if applicable   -Appointment time  -Documentation              Ofelia Sanchez  APRN-CNP,  PM-BC       Any medication(s) require lab monitoring? Yes   DEPAKOTE   Last Depakote Level: No results found for: \"VALPROATE\"  Last CBC with Platelets and Differential:   WBC Count   Date Value Ref Range Status   09/23/2023 11.8 (H) 4.0 - 11.0 10e3/uL Final     RBC Count   Date Value Ref Range Status   09/23/2023 4.58 4.40 - 5.90 10e6/uL Final     Hemoglobin   Date Value Ref Range Status   09/23/2023 15.0 13.3 - 17.7 g/dL Final     Hematocrit   Date Value Ref Range Status   09/23/2023 42.5 40.0 - 53.0 % Final     MCV   Date Value Ref Range Status   09/23/2023 93 78 - 100 fL Final     MCH   Date Value Ref Range Status   09/23/2023 32.8 26.5 - 33.0 pg Final     MCHC   Date Value Ref Range Status   09/23/2023 35.3 31.5 - 36.5 g/dL Final     RDW   Date Value Ref Range Status   09/23/2023 11.8 10.0 - 15.0 % Final     Platelet Count   Date Value Ref Range Status   09/23/2023 264 150 - 450 10e3/uL Final     % Neutrophils   Date Value Ref Range Status   09/23/2023 51 % Final     % Lymphocytes   Date Value Ref Range Status   09/23/2023 37 % Final     % Monocytes   Date Value Ref Range Status   09/23/2023 8 % Final     % Eosinophils   Date Value Ref Range Status   09/23/2023 3 % Final     % Basophils   Date Value Ref Range Status   09/23/2023 1 % Final     % Immature Granulocytes   Date Value Ref Range Status   09/23/2023 0 % Final     Absolute Neutrophils   Date Value Ref Range Status   09/23/2023 6.0 1.6 - " 8.3 10e3/uL Final     Absolute Lymphocytes   Date Value Ref Range Status   09/23/2023 4.3 0.8 - 5.3 10e3/uL Final     Absolute Monocytes   Date Value Ref Range Status   09/23/2023 1.0 0.0 - 1.3 10e3/uL Final     Absolute Eosinophils   Date Value Ref Range Status   09/23/2023 0.4 0.0 - 0.7 10e3/uL Final     Absolute Basophils   Date Value Ref Range Status   09/23/2023 0.1 0.0 - 0.2 10e3/uL Final     Absolute Immature Granulocytes   Date Value Ref Range Status   09/23/2023 0.0 <=0.4 10e3/uL Final      Last BMP/CMP Lab Values:   Sodium   Date Value Ref Range Status   09/23/2023 140 136 - 145 mmol/L Final     Potassium   Date Value Ref Range Status   09/23/2023 4.6 3.4 - 5.3 mmol/L Final     Chloride   Date Value Ref Range Status   09/23/2023 106 98 - 107 mmol/L Final     Carbon Dioxide (CO2)   Date Value Ref Range Status   09/23/2023 25 22 - 29 mmol/L Final     Anion Gap   Date Value Ref Range Status   09/23/2023 9 7 - 15 mmol/L Final     Urea Nitrogen   Date Value Ref Range Status   09/23/2023 13.6 6.0 - 20.0 mg/dL Final     Creatinine   Date Value Ref Range Status   09/23/2023 0.93 0.67 - 1.17 mg/dL Final     GFR Estimate   Date Value Ref Range Status   09/23/2023 >90 >60 mL/min/1.73m2 Final     Calcium   Date Value Ref Range Status   09/23/2023 8.8 8.6 - 10.0 mg/dL Final     Glucose   Date Value Ref Range Status   09/23/2023 94 70 - 99 mg/dL Final     Alkaline Phosphatase   Date Value Ref Range Status   09/23/2023 77 40 - 129 U/L Final     AST   Date Value Ref Range Status   09/23/2023 16 0 - 35 U/L Final     Comment:     Reference intervals for this test were updated on 6/12/2023 to more accurately reflect our healthy population. There may be differences in the flagging of prior results with similar values performed with this method. Interpretation of those prior results can be made in the context of the updated reference intervals.     ALT   Date Value Ref Range Status   09/23/2023 19 0 - 50 U/L Final     Comment:      Reference intervals for this test were updated on 6/12/2023 to more accurately reflect our healthy population. There may be differences in the flagging of prior results with similar values performed with this method. Interpretation of those prior results can be made in the context of the updated reference intervals.       Protein Total   Date Value Ref Range Status   09/23/2023 7.0 6.4 - 8.3 g/dL Final     Albumin   Date Value Ref Range Status   09/23/2023 4.3 3.5 - 5.2 g/dL Final     Bilirubin Total   Date Value Ref Range Status   09/23/2023 0.5 <=1.2 mg/dL Final

## 2025-02-15 NOTE — PROGRESS NOTES
[facility]      Mental Health & Addiction Service Line    Transition Clinic: Psychiatry Progress Note  Medication Management              ASSESSMENT/PLAN    Medications:  Continue:  Trazodone/Desyrel 100 mg at bedtime     Valproic Acid/Depakene 250 mg twice daily; TDD = 500 mg      Labs:  -None ordered      Therapy and or Non-pharmacological modalities:        Sleep:  -Get 7+ hours per night  -Avoid screens 60 minutes prior to bedtime    Nutrition:  -Anti-inflammatory diet: fruits, veggies, grains, plant proteins, lean animal protein (sparingly), dairy (small amounts)  -Omega 3's + fiber: food = first choice, supplements = second choice  -Avoid excessive amounts of: refined sugars + carbs, fried + fast foods    Activity:  -30 to 90 minutes (doesn't have to be consecutive) most days of the week  -Aerobic + strength/weight bearing  -Yoga + meditation/deep breathing      Disposition:  -Has been advised of consultative Transition Clinic model    -You do not need to return to the Transition Clinic for medication management    -Please make sure to keep the appointment for longitudinal outpatient psychiatry services  (see below):      Department: [facility]ico[facility] Valent[facility]  Provider: Dr. OSIEL Andrew MD  Location: [address] [place] [address] [place] [address]  Phone: [phone]  Date/Time/Visit type:  2/28/2025, at 9:00 am        Summary of Clinical Impressions:    Davion Tomas is a 20 y/o male with a prior history of:  -ASD  -ADHD Unspecified  -MDD  -PHILLIP  -Substance induced mood disorder w/psychotic features  -Polysubstance abuse: Sudafed, Ritalin, THC, Bromazolam   -Bipolar I     He initiated care at the Transition Clinic on 1/13/2025 for the management of psychotropics/bridging until able to establish long term   outpatient psychiatric services.    --------------------    Davion outlines he's not sure the Valproic Acid 250 mg BID is doing much of anything, although he does acknowledge he is probably less irritable    and is not experiencing as much anger/aggression when taking.    He summarizes tolerating and finding the Trazodone 100 mg at bedtime to be effective in stabilizing sleep patterns.    Although Davion endorses passive suicidal ideations he denies any immediate safety concerns towards himself or others at this time.          DSM-V and or working diagnosis:    1.  Bipolar I     2.  Autism Spectrum Disorder     3.  History as above        SAFETY EVALUATION:  Suicidal ideations:  -passive  -denies intent or plan  Homicidal ideations:  -denies  Risk factors:  -male  -prior attempt (remote)  -psychosocial stressors  Protective and mitigating factors:  -mother + some friends  Risk assessment:  -low to medium      SAFETY PLAN:  -Has numbers of at least 1 family member + 1 friend in personal contact list  -Knows clinic number(s) + operation of regular business hours   -Reviewed to utilize [employer] or text [place] to [phone] for mental health crisis  -Discussed to call 911 and or return to the nearest Emergency Department if unable to maintain safety of self or others (due to severity of suicidal and or homicidal ideations)      PSYCHIATRIC HISTORY    Individual therapy or IOP:   -Current individual therapy     Suicide attempts:  -1x  -Most recent: 2/18/2018 overdose via Tylenol     Inpatient psychiatric hospitalizations:  -Multiple  -Most recent: [facility] from 11/20 to 11/26/2024  -No hx of commitments      ECT:  -None reported            Medication Trials:     Other anxiolytics:  -Hydroxyzine 50 mg TID prn anxiety     Antipsychotics:  -Abilify: poor response + possible akathisia    -Haldol: during an iph = EPS  -Seroquel: poor response  + possible akathisia    -Zyprexa 10 mg     Beta blockers:  -Propranolol 20 mg BID: made me feel really strange, dropped heart rate to much     Stimulants/ADHD meds:  -Ritalin: abuse     Benzodiazepines:  -Abuse: see 9/22/2023 ip for further details            MEDICAL HISTORY  -The problem list was  reviewed via the Electronic Medical Record (EMR) prior to the appointment  -The patient denies any concerning physical and or medical symptoms during the interviewing process        MEDICATIONS      Current Outpatient Medications:     fluticasone (FLONASE) 50 MCG/ACT nasal spray, Spray 1 spray into both nostrils daily, Disp: 9.9 mL, Rfl: 0    traZODone (DESYREL) 100 MG tablet, Take 1 tablet (100 mg) by mouth at bedtime., Disp: 30 tablet, Rfl: 1    valproic acid (DEPAKENE) 250 MG capsule, Take 1 capsule (250 mg) by mouth 2 times daily., Disp: 180 capsule, Rfl: 0      If a controlled substance is prescribed during today's appointment:    -The [place] Prescription Monitoring Program has been reviewed and there are no current concerns with: diversionary activity, early refill requests, and or obtaining the medication from multiple providers.        VITALS    BP Readings from Last 3 Encounters:   12/25/24 (!) 150/91   10/02/23 (!) 147/95   09/22/23 (!) 172/90       Pulse Readings from Last 3 Encounters:   12/25/24 86   10/02/23 88   09/22/23 82       Wt Readings from Last 3 Encounters:   01/13/25 76.2 kg (168 lb)   12/25/24 74.8 kg (165 lb)   09/28/23 68.4 kg (150 lb 12.8 oz) (45%, Z= -0.13)*     * Growth percentiles are based on [facility] (Boys, 2-20 Years) data.           SCALES        4/12/2021     3:17 PM 7/19/2021     5:38 PM 1/13/2025     2:13 PM   PHQ   PHQ-9 Total Score 9  17    Q9: Thoughts of better off dead/self-harm past 2 weeks Not at all  Not at all    PHQ-A Total Score  0    PHQ-A Depressed most days in past year  No    PHQ-A Mood affect on daily activities  Not difficult at all    PHQ-A Suicide Ideation past 2 weeks  Not at all    PHQ-A Suicide Ideation past month  No    PHQ-A Previous suicide attempt  Yes        Proxy-reported            6/4/2019     2:55 PM 12/13/2019     1:59 PM 1/13/2025     2:14 PM   PHILLIP-7 SCORE   Total Score 4 (minimal anxiety)   19 (severe anxiety)   Total Score 4 14 19         Proxy-reported           SUBSTANCE USE      Prior use:  Per chart review; see [facility]/22/2023 [facility] for further details     Polysubstance abuse:  -Sudafed  -Ritalin  -THC  -Bromazolam 1.5 mg weekly              MENTAL STATUS EXAMINATION    Appearance: Adequately Groomed, Attire Appropriate for the Season  General Behavior:  Cooperative, Direct + Indirect Eye Contact  Speech: Intermittently excessive  Musculoskeletal:    -Normal gait/Station (walking down stairs + outside during the telehealth visit)  -No facial tics/tremors observed   -Motor coordination is grossly intact   Mood: Alright   Affect: Appropriate to Content of Speech and Circumstances  Attention: Intact  Orientation:  Person, Place, Time, Situation  Thought Associations:  Intact  Thought Content: Reality based   Thought Processes: Organized, Normal to fast rate  Memory: No overt impairment; no screenings or formal testing performed  Language: Intact  Judgement: Fair  Insight: Fair           INTERIM HX:    -It's a lot of the same old, same old  -Since I got back on the Depakote I haven't noticed to much, but then I think it might be making me less irritable + not as much aggression            PSYCHIATRIC ROS    Sleep:  -Go to bed:  midnight to 1:30 am  -Get up: 10 am, most days of the week    -Able to fall asleep consistently (within an hour or two) since starting the Trazodone 100 mg  -If I don't take it, it's hard to be able to fall asleep at all  -No longer need to drink alcohol to address bouts of insomnia      Mood/Anxiety:  -See PHQ-9 score    -See PHILLIP-7 score      Laura/Psychosis:   See the following for further details:    -11/20/2024 ip at [facility]  -9/22/2023 ip at [facility] = substance induced     -------------------    In the interim hx, denies:  -Need for less sleep with an increase in goal directed tasks  -Inflated self esteem, grandiose thoughts, flight of ideas  -Engaging in: high amounts of spending, gambling, high risk behaviors (such as  being hypersexual, excessive alcohol or   recreational/illicit drug use)         Suicidal ideations:  -Intermittently but the suicidal ideations really aren't frequent or very intense  -Denies intent or plan       SIB:  -None since 12 or 14 y/o       Side effects:  -Valproic Acid = might make me a bit spacey but it's kind of hard to tell        VISIT INFORMATION    Date:  2025       Number:  -2nd    Patient Identifying Information:  Legal name: Davion Tomas  Preferred name: Davion  : 2005    Participants:   -Patient  -Provider      Telehealth visit details:  Type of service:  Video Visit, then changed to TE due to IT issues     Start: 2:36 pm  End:  3:03 pm    Originating Location (pt. Location): Home     Distant Location (provider location):  Off-site  Platform used for Video Visit: HidInImage    Total time:  33 minutes per:    -Review of EMR including but not limited to: tabs within Chart Review + outside records if applicable   -Appointment time  -Documentation        Ofelia AKERS-CNP, [facility]-BC        ----------------------------------------------------------------------------------------------------------------------        TREATMENT RISK STATEMENT    The risks, benefits, alternatives, and potential adverse effects have been explained and are understood by the patient.  The patient agrees to the treatment plan with their ability to do so.      The patient knows to call the clinic: [phone]  for any problems or concerns until the next psychiatry visit, regardless if it is within or outside of the [facility] system.     If unable to reach clinic staff (via phone call or medical messaging) during the normal business hours: 8:00 am to 4:30 pm then it is recommended accessing the nearest: emergency department, urgent care facility, or utilizing local (varies based on county of residence) and national crisis #'s or text messaging services for immediate  assistance.          ----------------------------------------------------------------------------------------------------------------------      If applicable the following has been discussed with the patient, parent/guardian, and or attending family member during the appointment:      Risks of polypharmacy and possible drug interactions with current medication list + common OTC products, herbs, and supplements.  Moving forward, it is suggested to intermittently check-in with a clinic or retail pharmacist whenever new medications or OTC/h/s are consumed.    Risks of polypharmacy and possible drug + drug interactions with current medication list.  Moving forward, it is suggested to intermittently check-in with a clinic or retail pharmacist whenever new prescription medications are added to your treatment regimen.    Recommendation to adhere to CDC guidelines as it relates to alcohol consumption.  If taking benzodiazepines, you should abstain from alcohol intake due to increased risks of CNS and respiratory depression, as well as psychomotor impairment.    If possible, it is recommended to avoid concurrent use of prescribed: opioids + benzodiazepines due to increased risks of CNS and respiratory depression, as well as the increased risk of overdose.     Recommendation to minimize and or abstain from THC use (unless you are prescribed medical marijuana).    Recommendation to abstain from illicit substances including but not limited to the following: heroin, street fentanyl, cocaine, methamphetamines, bath salts, and other synthetic products.    Recommendation to abstain from tobacco/smoking/nicotine, alcohol, THC, and all illicit substances if trying to become or are pregnant.    Do not take opioids, stimulants, and or other prescription medications unless they are specifically prescribed for you.     Black Box Warnings associated with the prescribed psychotropic(s).     Potential adverse effects of antipsychotics  including but not limited to the following: weight gain, metabolic syndrome, EPS/Tardive Dyskinesias, and Neuroleptic Malignant Syndrome.    Potential adverse effects of stimulants including but not limited to the following: sudden death, MI, stroke, HTN, cardiomyopathy (long term use), seizures, gianni, psychosis, and aggressive behaviors.

## 2025-02-17 ENCOUNTER — VIRTUAL VISIT (OUTPATIENT)
Dept: BEHAVIORAL HEALTH | Facility: CLINIC | Age: 20
End: 2025-02-17
Payer: COMMERCIAL

## 2025-02-17 DIAGNOSIS — F31.9 BIPOLAR I DISORDER (H): Primary | ICD-10-CM

## 2025-02-17 DIAGNOSIS — F84.0 AUTISM SPECTRUM DISORDER: ICD-10-CM

## 2025-02-17 RX ORDER — TRAZODONE HYDROCHLORIDE 100 MG/1
100 TABLET ORAL AT BEDTIME
Qty: 90 TABLET | Refills: 0 | Status: SHIPPED | OUTPATIENT
Start: 2025-02-17

## 2025-02-17 ASSESSMENT — PAIN SCALES - GENERAL: PAINLEVEL_OUTOF10: NO PAIN (0)

## 2025-02-17 NOTE — NURSING NOTE
Current patient location: [address] [place] [id number]    Is the patient currently in the state of [place]? YES    Visit mode: VIDEO    If the visit is dropped, the patient can be reconnected by:VIDEO VISIT: Text to cell phone:   Telephone Information:   Mobile [phone]    and VIDEO VISIT: Send to e-mail at: [email or web address]    Will anyone else be joining the visit? NO  (If patient encounters technical issues they should call [phone])    Are changes needed to the allergy or medication list? No    Are refills needed on medications prescribed by this physician? YES    traZODone (DESYREL) 100 MG tablet    Rooming Documentation:  Patient will complete questionnaire(s) in [facility] Unable to complete questionnaire(s) due to time    Reason for visit: RECHECK    Lali BENITES

## 2025-02-17 NOTE — PATIENT INSTRUCTIONS
Continue:  Trazodone/Desyrel 100 mg at bedtime     Valproic Acid/Depakene 250 mg twice daily; TDD = 500 mg    ------------------------------    -You do not need to return to the Transition Clinic for medication management    -Please make sure to keep the appointment for longitudinal outpatient psychiatry services  (see below):      Department: [facility]  Provider: Dr. OSIEL Andrew MD  Location: [address] [place] [address]  Phone: [phone]  Date/Time/Visit type:  2/28/2025, at 9:00 am      ---------------------------      -If there are questions/inquiries between now and the upcoming follow up appointment OR prior to transferring to the next level of care, please call or message the [facility] Psychiatry Department.    Clinic hours/phone number:   -Monday to Friday from 8:00 am to 4:30 pm  -[phone]      MyChart:  -IS NOT an emergency messaging service  -Should not be considered equivalent to text messages   -Please allow up to 3 business days for a reply   -If you do not receive a response within 3 business days, please do not hesitate to contact us again via calling (see above) or messaging to check on the status of your questions or concerns    -----------------------    Call:  -[phone] ([phone]) if guidance is needed after [facility] clinic hours about utilizing [facility] versus the Emergency Departments    ----------------------    Any time (during or after regular clinic hours) immediate and or life threatening symptoms occur (either medical or mental health), call:  [phone] and or go to the nearest Emergency Department      ---------------------    Please use the following websites to obtain a comprehensive list of all counties within the [facility] for adult and child mental health crisis  numbers:    https://[place].gov/dhs/people-we-serve/people-with-disabilities/health-care/adult-mental-health/resources/crisis-contacts.jsp    https://[place].gov/dhs/people-we-serve/people-with-disabilities/health-care/childrens-mental-health/resources/crisis-contacts.jsp      -------------------      Below is a list of county (also found on the above websites), state, and national crisis numbers.   These resources can provide real-time assistance if experiencing moderate to severe mental health symptoms.        Additionally, please visit your county website to find the most up-to-date list of local:  adult, child, family, and chemical dependency services available.        [facility]  [phone]    [facility]  [phone]    [facility]  [phone]    [facility]  [phone]    [facility]  [phone]: Adults 18 and over    [phone]: Children 17 and under    [facility] [place] [facility] ([facility] Acute Psychiatric Services (APS) Assessment & Referral:   [phone]    Community Outreach for Psychiatric Emergencies (COPE):  [phone]    [facility]  [phone]: Adults 18 and over    [phone]: Children 17 and under      Walk-in crisis services are available Monday to Friday from 8 am to 5:30 pm at Urgent Care for  Adult metal health:    [address] [place] [address]  Closed on Saturday, Sunday, and holidays    [place]  [phone]: Toll free    [facility]  [phone]      [facility] [place]  [phone]: Toll free    Text: [place] to 068370      [facility] and human services  Call 211 or visit https://www.211[address].org to obtain free and confidential h/hs information     [place] [facility]  If you are an adult needing support, you can talk to a specialist who has first hand experience living with a mental health condition:    [phone]    Text: Support to [phone]    [employer] [place]:  domestic  violence/LGBTQ+  971-177-3957 option 3    [employer]: LGBTQ+  0-917-449-7352    Text: START to 912102     Resources  0-957-ApiwHzk: (1-897.521.9359)    Crisis line: 1-369.155.4534    Text: 573631    [employer]: women and sexually exploited youth  [phone]    [employer] Domestic Violence Shelter Hotline  721.166.9753    National Suicide Prevention Lifeline  985    National Youth Crisis Hotline  209

## 2025-05-15 DIAGNOSIS — F31.9 BIPOLAR I DISORDER (H): ICD-10-CM

## 2025-05-15 RX ORDER — TRAZODONE HYDROCHLORIDE 100 MG/1
100 TABLET ORAL AT BEDTIME
Qty: 90 TABLET | Refills: 0 | OUTPATIENT
Start: 2025-05-15